# Patient Record
Sex: FEMALE | Race: WHITE | URBAN - METROPOLITAN AREA
[De-identification: names, ages, dates, MRNs, and addresses within clinical notes are randomized per-mention and may not be internally consistent; named-entity substitution may affect disease eponyms.]

---

## 2018-07-17 ENCOUNTER — THERAPY VISIT (OUTPATIENT)
Dept: PHYSICAL THERAPY | Facility: CLINIC | Age: 48
End: 2018-07-17
Payer: COMMERCIAL

## 2018-07-17 DIAGNOSIS — R10.2 PELVIC PAIN IN FEMALE: Primary | ICD-10-CM

## 2018-07-17 DIAGNOSIS — N39.3 FEMALE STRESS INCONTINENCE: ICD-10-CM

## 2018-07-17 PROCEDURE — G8991 OTHER PT/OT GOAL STATUS: HCPCS | Mod: GP | Performed by: PHYSICAL THERAPIST

## 2018-07-17 PROCEDURE — G8990 OTHER PT/OT CURRENT STATUS: HCPCS | Mod: GP | Performed by: PHYSICAL THERAPIST

## 2018-07-17 PROCEDURE — 97161 PT EVAL LOW COMPLEX 20 MIN: CPT | Mod: GP | Performed by: PHYSICAL THERAPIST

## 2018-07-17 PROCEDURE — 97110 THERAPEUTIC EXERCISES: CPT | Mod: GP | Performed by: PHYSICAL THERAPIST

## 2018-07-17 PROCEDURE — 97535 SELF CARE MNGMENT TRAINING: CPT | Mod: GP | Performed by: PHYSICAL THERAPIST

## 2018-07-17 NOTE — PROGRESS NOTES
[facility] Initial Evaluation  Subjective:  Patient is a 47 year old female presenting with rehab pelvic hpi. The history is provided by the patient. No  was used.   Rhys Kennedy is a 47 year old female with a incontinence and pelvic dysfunction condition.  Condition occurred with:  Insidious onset.  Condition occurred: at home.  This is a recurrent condition  Oct 2018-Jan 2019 started having more stress incontinence due to being sick most of the winter with hard coughing/sneezing. Also pain with intercourse ~ 10 months. If has full bladder will leak with cough/sneeze. Voiding every 2-3 hours. Does Pilates almost daily. Goal is to get muscles more normal and review HEP. Denies urgency. Denies any LBP. A lot of life stress going on now. .    Patient reports pain:  N/a.           Symptoms are exacerbated by sneezing, coughing and intercourse   Since onset symptoms are unchanged.    Previous treatment includes physical therapy.  There was significant improvement following previous treatment.  General health as reported by patient is excellent.  Pertinent medical history includes:  Anemia.  Medical allergies: none.  Other surgeries include:  None.  Current medications:  Anti-depressants, sleep medication and high blood pressure medication (for hair loss).  Current occupation is Self-employed (Beauty counter).                                    Objective:  Standing Alignment:        Lumbar:  Lordosis incr                                                   Pelvic Dysfunction Evaluation:    Bladder/Pelvic Problems:    Storage Problem:  Stress incontinence  Emptying Problem:  Dyspareunia      Diagnostic Tests:    Pelvic Exam:  Yes                      Flexibility:    Tightness present at:Adductors; Piriformis and Gluteals    Abdominal Wall:  normal        Pelvic Clock Exam:    Ischiocavernosis pain:  -  Bulbocavernosis pain:  -  Transverse Perineal:  -  Levator ANI:   ++      Reflex Testing:  normal    External Assessment:    Skin Condition:  Normal    Bearing Down/Coughing:  Normal  Tissue Symmetry:  Normal  Introitus:  Normal  Muscle Contraction/Perineal Mobility:  Slight lift, no urogential triangle descent  Internal Assessment:  Internal assessment pelvic: Able to relax fully after contraction.    Contraction/Grade:  Fair squeeze, definite lift (3)          Additional History:  Delivery History:  Vaginal delivery  Number of Pregnancies: 1  Number of Live Births: 1                       General     ROS    Assessment/Plan:    Patient is a 47 year old female with pelvic complaints.    Patient has the following significant findings with corresponding treatment plan.                Diagnosis 1:  WILLIE/pelvic pain  Pain -  manual therapy, self management, education and home program  Decreased ROM/flexibility - manual therapy and therapeutic exercise  Decreased strength - therapeutic exercise and therapeutic activities  Decreased proprioception - neuro re-education and therapeutic activities  Impaired muscle performance - neuro re-education  Decreased function - therapeutic activities  Impaired posture - neuro re-education    Therapy Evaluation Codes:   1) History comprised of:   Personal factors that impact the plan of care:      None.    Comorbidity factors that impact the plan of care are:      None.     Medications impacting care: None.  2) Examination of Body Systems comprised of:   Body structures and functions that impact the plan of care:      Pelvis.   Activity limitations that impact the plan of care are:      [place] and Stress incontinence.  3) Clinical presentation characteristics are:   Stable/Uncomplicated.  4) Decision-Making    Low complexity using standardized patient assessment instrument and/or measureable assessment of functional outcome.  Cumulative Therapy Evaluation is: Low complexity.    Previous and current functional limitations:  (See Goal Flow Sheet  for this information)    Short term and Long term goals: (See Goal Flow Sheet for this information)     Communication ability:  Patient appears to be able to clearly communicate and understand verbal and written communication and follow directions correctly.  Treatment Explanation - The following has been discussed with the patient:   RX ordered/plan of care  Anticipated outcomes  Possible risks and side effects  This patient would benefit from PT intervention to resume normal activities.   Rehab potential is excellent.    Frequency:  1 X week, once daily  Duration:  for 6 weeks  Discharge Plan:  Achieve all LTG.  Independent in home treatment program.  Reach maximal therapeutic benefit.    Please refer to the daily flowsheet for treatment today, total treatment time and time spent performing 1:1 timed codes.

## 2018-07-17 NOTE — MR AVS SNAPSHOT
After Visit Summary   7/17/2018    Rhys Kennedy    MRN: [id number]           Patient Information     Date Of Birth          1970        Visit Information        Provider Department      7/17/2018 9:20 AM Alison Prado, PT [facility]        Today's Diagnoses     Pelvic pain in female    -  1    Female stress incontinence           Follow-ups after your visit        Your next 10 appointments already scheduled     Jul 25, 2018  1:35 PM CDT   [facility] with Alison Naomy Morrissey, PT   [facility] ([facility]  )    [address] [address] [address]   [phone]            Aug 03, 2018  1:35 PM CDT   [facility] with Alison Naomy Morrissey, PT   [facility] ([facility]  )    [address] [address]   [phone]            Aug 07, 2018  1:10 PM CDT   [facility] with Alison Naomy Morrissey, PT   [facility] ([facility]  )    [address] [address] [address]   [phone]              Who to contact     If you have questions or need follow up information about today's clinic visit or your schedule please contact [facility] directly at [phone].  Normal or non-critical lab and imaging results will be communicated to you by MyChart, letter or phone within 4 business days after the clinic has received the results. If you do not hear from us within 7 days, please contact the clinic through MyChart or phone. If you have a critical or abnormal lab result, we will notify you by phone as soon as possible.  Submit refill requests through [employer] or call your pharmacy and they will forward the refill request to us. Please allow 3 business days for your refill to be completed.          Additional Information About Your Visit        Care EveryWhere ID     This is your Care EveryWhere ID. This could be used by other organizations to access your [facility]  medical records  [id number]         Blood Pressure from Last 3 Encounters:   No data found for BP    Weight from Last 3 Encounters:   No data found for Wt              We Performed the Following     HC PT EVAL, LOW COMPLEXITY     [facility] INITIAL EVAL REPORT     SELF CARE MNGMENT TRAINING     THERAPEUTIC EXERCISES        Primary Care Provider Office Phone # Fax #    Brigitte CLEMENTE Loraine [phone]       [facility] [address]   [facility] [address]        Equal Access to Services     CHRIS CORRAL : Hadii aad ku hadasho Soomaali, waaxda luqadaha, qaybta kaalmada adeegyada, waxay idiin hayaan adeeg kharash la'aan . So [facility] [phone].    ATENCIÓN: Si habla español, tiene a walters disposición servicios gratuitos de asistencia lingüística. [facility] [phone].    We comply with applicable federal civil rights laws and [place] laws. We do not discriminate on the basis of race, color, national origin, age, disability, sex, sexual orientation, or gender identity.            Thank you!     Thank you for choosing [facility]  for your care. Our goal is always to provide you with excellent care. Hearing back from our patients is one way we can continue to improve our services. Please take a few minutes to complete the written survey that you may receive in the mail after your visit with us. Thank you!             Your Updated Medication List - Protect others around you: Learn how to safely use, store and throw away your medicines at www.disposemymeds.org.      Notice  As of 7/17/2018 10:56 AM    You have not been prescribed any medications.

## 2018-07-17 NOTE — LETTER
[facility]  [address] [place],[address]  [facility] [address]  [phone]    2018    Re: Rhys Kennedy   :   1970  MRN:  [id number]   REFERRING PHYSICIAN:   [facility]    Date of Initial Evaluation:  2018  Visits:  Rxs Used: 1  Reason for Referral:     Pelvic pain in female  Female stress incontinence      [facility] Initial Evaluation  Subjective:  Patient is a 47 year old female presenting with rehab pelvic hpi. The history is provided by the patient. No  was used.   Rhys Kennedy is a 47 year old female with a incontinence and pelvic dysfunction condition.  Condition occurred with:  Insidious onset.  Condition occurred: at home.  This is a recurrent condition  Oct 2018-2019 started having more stress incontinence due to being sick most of the winter with hard coughing/sneezing. Also pain with intercourse ~ 10 months. If has full bladder will leak with cough/sneeze. Voiding every 2-3 hours. Does Pilates almost daily. Goal is to get muscles more normal and review HEP. Denies urgency. Denies any LBP. A lot of life stress going on now. .    Patient reports pain:  N/a.           Symptoms are exacerbated by sneezing, coughing and intercourse   Since onset symptoms are unchanged.    Previous treatment includes physical therapy.  There was significant improvement following previous treatment.  General health as reported by patient is excellent.  Pertinent medical history includes:  Anemia.  Medical allergies: none.  Other surgeries include:  None.  Current medications:  Anti-depressants, sleep medication and high blood pressure medication (for hair loss).  Current occupation is Self-employed (Beauty counter).        Objective:  Standing Alignment:      Lumbar:  Lordosis incr    Pelvic Dysfunction Evaluation:    Bladder/Pelvic Problems:    Storage Problem:  Stress incontinence  Emptying Problem:   Dyspareunia      Diagnostic Tests:    Pelvic Exam:  Yes    Flexibility:    Tightness present at:Adductors; Piriformis and Gluteals  Re: Rhys Kennedy   :   1970    Abdominal Wall:  normal    Pelvic Clock Exam:    Ischiocavernosis pain:  -  Bulbocavernosis pain:  -  Transverse Perineal:  -  Levator ANI:  ++    Reflex Testing:  normal    External Assessment:    Skin Condition:  Normal    Bearing Down/Coughing:  Normal  Tissue Symmetry:  Normal  Introitus:  Normal  Muscle Contraction/Perineal Mobility:  Slight lift, no urogential triangle descent  Internal Assessment:  Internal assessment pelvic: Able to relax fully after contraction.    Contraction/Grade:  Fair squeeze, definite lift (3)    Additional History:  Delivery History:  Vaginal delivery  Number of Pregnancies: 1  Number of Live Births: 1       Assessment/Plan:    Patient is a 47 year old female with pelvic complaints.    Patient has the following significant findings with corresponding treatment plan.                Diagnosis 1:  WILLIE/pelvic pain  Pain -  manual therapy, self management, education and home program  Decreased ROM/flexibility - manual therapy and therapeutic exercise  Decreased strength - therapeutic exercise and therapeutic activities  Decreased proprioception - neuro re-education and therapeutic activities  Impaired muscle performance - neuro re-education  Decreased function - therapeutic activities  Impaired posture - neuro re-education    Therapy Evaluation Codes:   1) History comprised of:   Personal factors that impact the plan of care:      None.    Comorbidity factors that impact the plan of care are:      None.     Medications impacting care: None.  2) Examination of Body Systems comprised of:   Body structures and functions that impact the plan of care:      Pelvis.   Activity limitations that impact the plan of care are:      [place] and Stress incontinence.  Re: Rhys Kennedy   :   1970      3) Clinical  presentation characteristics are:   Stable/Uncomplicated.  4) Decision-Making    Low complexity using standardized patient assessment instrument and/or measureable assessment of functional outcome.  Cumulative Therapy Evaluation is: Low complexity.    Previous and current functional limitations:  (See Goal Flow Sheet for this information)    Short term and Long term goals: (See Goal Flow Sheet for this information)     Communication ability:  Patient appears to be able to clearly communicate and understand verbal and written communication and follow directions correctly.  Treatment Explanation - The following has been discussed with the patient:   RX ordered/plan of care  Anticipated outcomes  Possible risks and side effects  This patient would benefit from PT intervention to resume normal activities.   Rehab potential is excellent.    Frequency:  1 X week, once daily  Duration:  for 6 weeks  Discharge Plan:  Achieve all LTG.  Independent in home treatment program.  Reach maximal therapeutic benefit.    Thank you for your referral.    INQUIRIES  Therapist: Alison Prado, MS, PT, OCS  [facility]  [address], [address]  [facility] [address]  Phone: [phone]  Fax: [phone]

## 2018-07-25 ENCOUNTER — THERAPY VISIT (OUTPATIENT)
Dept: PHYSICAL THERAPY | Facility: CLINIC | Age: 48
End: 2018-07-25
Payer: COMMERCIAL

## 2018-07-25 DIAGNOSIS — N39.3 FEMALE STRESS INCONTINENCE: ICD-10-CM

## 2018-07-25 DIAGNOSIS — R10.2 PELVIC PAIN IN FEMALE: ICD-10-CM

## 2018-07-25 PROCEDURE — 97535 SELF CARE MNGMENT TRAINING: CPT | Mod: GP | Performed by: PHYSICAL THERAPIST

## 2018-07-25 PROCEDURE — 97140 MANUAL THERAPY 1/> REGIONS: CPT | Mod: GP | Performed by: PHYSICAL THERAPIST

## 2018-08-02 ENCOUNTER — THERAPY VISIT (OUTPATIENT)
Dept: PHYSICAL THERAPY | Facility: CLINIC | Age: 48
End: 2018-08-02
Payer: COMMERCIAL

## 2018-08-02 DIAGNOSIS — N39.3 FEMALE STRESS INCONTINENCE: ICD-10-CM

## 2018-08-02 DIAGNOSIS — R10.2 PELVIC PAIN IN FEMALE: ICD-10-CM

## 2018-08-02 PROCEDURE — 97110 THERAPEUTIC EXERCISES: CPT | Mod: GP | Performed by: PHYSICAL THERAPIST

## 2018-08-02 PROCEDURE — 97140 MANUAL THERAPY 1/> REGIONS: CPT | Mod: GP | Performed by: PHYSICAL THERAPIST

## 2018-08-17 ENCOUNTER — THERAPY VISIT (OUTPATIENT)
Dept: PHYSICAL THERAPY | Facility: CLINIC | Age: 48
End: 2018-08-17
Payer: COMMERCIAL

## 2018-08-17 DIAGNOSIS — R10.2 PELVIC PAIN IN FEMALE: ICD-10-CM

## 2018-08-17 DIAGNOSIS — N39.3 FEMALE STRESS INCONTINENCE: ICD-10-CM

## 2018-08-17 PROCEDURE — 97110 THERAPEUTIC EXERCISES: CPT | Mod: GP | Performed by: PHYSICAL THERAPIST

## 2018-08-17 PROCEDURE — 97140 MANUAL THERAPY 1/> REGIONS: CPT | Mod: GP | Performed by: PHYSICAL THERAPIST

## 2018-08-28 ENCOUNTER — THERAPY VISIT (OUTPATIENT)
Dept: PHYSICAL THERAPY | Facility: CLINIC | Age: 48
End: 2018-08-28
Payer: COMMERCIAL

## 2018-08-28 DIAGNOSIS — N39.3 FEMALE STRESS INCONTINENCE: ICD-10-CM

## 2018-08-28 DIAGNOSIS — R10.2 PELVIC PAIN IN FEMALE: ICD-10-CM

## 2018-08-28 PROCEDURE — 97110 THERAPEUTIC EXERCISES: CPT | Mod: GP | Performed by: PHYSICAL THERAPIST

## 2018-08-28 PROCEDURE — 97140 MANUAL THERAPY 1/> REGIONS: CPT | Mod: GP | Performed by: PHYSICAL THERAPIST

## 2018-09-13 ENCOUNTER — THERAPY VISIT (OUTPATIENT)
Dept: PHYSICAL THERAPY | Facility: CLINIC | Age: 48
End: 2018-09-13
Payer: COMMERCIAL

## 2018-09-13 DIAGNOSIS — R10.2 PELVIC PAIN IN FEMALE: ICD-10-CM

## 2018-09-13 DIAGNOSIS — N39.3 FEMALE STRESS INCONTINENCE: ICD-10-CM

## 2018-09-13 PROCEDURE — 97112 NEUROMUSCULAR REEDUCATION: CPT | Mod: GP | Performed by: PHYSICAL THERAPIST

## 2018-09-13 PROCEDURE — G8991 OTHER PT/OT GOAL STATUS: HCPCS | Mod: GP | Performed by: PHYSICAL THERAPIST

## 2018-09-13 PROCEDURE — G8992 OTHER PT/OT  D/C STATUS: HCPCS | Mod: GP | Performed by: PHYSICAL THERAPIST

## 2018-09-13 PROCEDURE — 97140 MANUAL THERAPY 1/> REGIONS: CPT | Mod: GP | Performed by: PHYSICAL THERAPIST

## 2018-09-13 NOTE — MR AVS SNAPSHOT
After Visit Summary   9/13/2018    Rhys Kennedy    MRN: [id number]           Patient Information     Date Of Birth          1970        Visit Information        Provider Department      9/13/2018 12:00 PM Alison Prado, PT [facility]        Today's Diagnoses     Female stress incontinence        Pelvic pain in female           Follow-ups after your visit        Who to contact     If you have questions or need follow up information about today's clinic visit or your schedule please contact [facility] directly at [phone].  Normal or non-critical lab and imaging results will be communicated to you by MyChart, letter or phone within 4 business days after the clinic has received the results. If you do not hear from us within 7 days, please contact the clinic through MyChart or phone. If you have a critical or abnormal lab result, we will notify you by phone as soon as possible.  Submit refill requests through [employer] or call your pharmacy and they will forward the refill request to us. Please allow 3 business days for your refill to be completed.          Additional Information About Your Visit        Care EveryWhere ID     This is your Care EveryWhere ID. This could be used by other organizations to access your [facility] medical records  [id number]         Blood Pressure from Last 3 Encounters:   No data found for BP    Weight from Last 3 Encounters:   No data found for Wt              We Performed the Following     [facility] PROGRESS NOTES REPORT     MANUAL THER TECH,1+REGIONS,EA 15 MIN     NEUROMUSCULAR RE-EDUCATION        Primary Care Provider Office Phone # Fax #    Brigitte CLEMENTE Carson [phone]       [place] [address] [place] [address]        Equal Access to Services     CHRIS CORRAL : Jn neely Soangelina, waaxda luqadaha, qaybta kaaltito morfin . So [facility]  [phone].    ATENCIÓN: Si habla español, tiene a walters disposición servicios gratuitos de asistencia lingüística. [place] al [phone].    We comply with applicable federal civil rights laws and [place] laws. We do not discriminate on the basis of race, color, national origin, age, disability, sex, sexual orientation, or gender identity.            Thank you!     Thank you for choosing [facility]  for your care. Our goal is always to provide you with excellent care. Hearing back from our patients is one way we can continue to improve our services. Please take a few minutes to complete the written survey that you may receive in the mail after your visit with us. Thank you!             Your Updated Medication List - Protect others around you: Learn how to safely use, store and throw away your medicines at www.disposemymeds.org.      Notice  As of 9/13/2018 12:33 PM    You have not been prescribed any medications.

## 2018-09-13 NOTE — PROGRESS NOTES
Subjective:  HPI                    Objective:  System    Physical Exam    General     ROS    Assessment/Plan:    DISCHARGE REPORT    Progress reporting period is from 09/13/18.       SUBJECTIVE  Subjective changes noted by patient:  .  Subjective: Feels ready to continue working on her own and with Pilates class. Hasn't had any coughs/cold to really test out repetitive sneeze/cough. Also no attempts at intimacy for a few months due to life situation.     Current pain level is NA  .     Previous pain level was  NA  .   Changes in function:  Yes (See Goal flowsheet attached for changes in current functional level)  Adverse reaction to treatment or activity: None    OBJECTIVE  Changes noted in objective findings:  Yes,   Objective: Ready to continue with independent HEP/Pilates. D. recti 1 finger width at umbilicus so WNL. Rev'd exhalation with abdominal work. Kegel strength 4/5. No TTP to pelvic floor today.      ASSESSMENT/PLAN  Updated problem list and treatment plan:   STG/LTGs have been met or progress has been made towards goals:  Yes (See Goal flow sheet completed today.)  Assessment of Progress: The patient's condition is improving.  Self Management Plans:  Patient is independent in a home treatment program.  Patient is independent in self management of symptoms.    Rhys continues to require the following intervention to meet STG and LTG's:  PT intervention is no longer required to meet STG/LTG.    Recommendations:  This patient is ready to be discharged from therapy and continue their home treatment program.    Please refer to the daily flowsheet for treatment today, total treatment time and time spent performing 1:1 timed codes.

## 2018-09-13 NOTE — LETTER
[facility]  [address]   [address]  [place] [address]  [phone]    2018    Re: Rhys Kennedy   :   1970  MRN:  [id number]   REFERRING PHYSICIAN:   Brigitte NEIL PT    Date of Initial Evaluation:  2018  Visits:  Rxs Used: 6  Reason for Referral:     Female stress incontinence  Pelvic pain in female    DISCHARGE REPORT    Progress reporting period is from 18.       SUBJECTIVE  Subjective changes noted by patient:  .  Subjective: Feels ready to continue working on her own and with Pilates class. Hasn't had any coughs/cold to really test out repetitive sneeze/cough. Also no attempts at intimacy for a few months due to life situation.     Current pain level is NA  .     Previous pain level was  NA  .   Changes in function:  Yes (See Goal flowsheet attached for changes in current functional level)  Adverse reaction to treatment or activity: None    OBJECTIVE  Changes noted in objective findings:  Yes,   Objective: Ready to continue with independent HEP/Pilates. D. recti 1 finger width at umbilicus so WNL. Rev'd exhalation with abdominal work. Kegel strength 4/5. No TTP to pelvic floor today.      ASSESSMENT/PLAN  Updated problem list and treatment plan:   STG/LTGs have been met or progress has been made towards goals:  Yes (See Goal flow sheet completed today.)  Assessment of Progress: The patient's condition is improving.  Self Management Plans:  Patient is independent in a home treatment program.  Patient is independent in self management of symptoms.    Rhys continues to require the following intervention to meet STG and LTG's:  PT intervention is no longer required to meet STG/LTG.    Recommendations:  This patient is ready to be discharged from therapy and continue their home treatment program.    Thank you for your referral.    INQUIRIES  Therapist: Alison Prado, MS, PT, OCS  [facility]  [address]   [address]  [facility] [address]  Phone: [phone]  Fax: [phone]